# Patient Record
Sex: FEMALE | URBAN - NONMETROPOLITAN AREA
[De-identification: names, ages, dates, MRNs, and addresses within clinical notes are randomized per-mention and may not be internally consistent; named-entity substitution may affect disease eponyms.]

---

## 2021-11-30 ENCOUNTER — APPOINTMENT (OUTPATIENT)
Dept: URBAN - NONMETROPOLITAN AREA CLINIC 50 | Age: 26
Setting detail: DERMATOLOGY
End: 2021-11-30

## 2021-11-30 VITALS — WEIGHT: 293 LBS | HEIGHT: 65 IN

## 2021-11-30 DIAGNOSIS — L92.1 NECROBIOSIS LIPOIDICA, NOT ELSEWHERE CLASSIFIED: ICD-10-CM

## 2021-11-30 PROCEDURE — OTHER COUNSELING: OTHER

## 2021-11-30 PROCEDURE — OTHER INTRALESIONAL KENALOG: OTHER

## 2021-11-30 PROCEDURE — 11901 INJECT SKIN LESIONS >7: CPT

## 2021-11-30 ASSESSMENT — LOCATION SIMPLE DESCRIPTION DERM: LOCATION SIMPLE: LEFT PRETIBIAL REGION

## 2021-11-30 ASSESSMENT — LOCATION ZONE DERM: LOCATION ZONE: LEG

## 2021-11-30 ASSESSMENT — LOCATION DETAILED DESCRIPTION DERM: LOCATION DETAILED: LEFT PROXIMAL PRETIBIAL REGION

## 2021-11-30 NOTE — PROCEDURE: INTRALESIONAL KENALOG
Medical Necessity Clause: This procedure was medically necessary because the lesions that were treated were:
Consent: The risks of atrophy were reviewed with the patient.
Concentration Of Solution Injected (Mg/Ml): 2.5
X Size Of Lesion In Cm (Optional): 0
Administered By (Optional): Will Beltrán DO
Total Volume Injected (Ccs- Only Use Numbers And Decimals): 1
Validate Note Data When Using Inventory: Yes
Kenalog Preparation: Kenalog
Detail Level: Detailed
Include Z78.9 (Other Specified Conditions Influencing Health Status) As An Associated Diagnosis?: No